# Patient Record
Sex: MALE | Race: WHITE | URBAN - NONMETROPOLITAN AREA
[De-identification: names, ages, dates, MRNs, and addresses within clinical notes are randomized per-mention and may not be internally consistent; named-entity substitution may affect disease eponyms.]

---

## 2017-10-13 ENCOUNTER — TELEPHONE (OUTPATIENT)
Dept: FAMILY MEDICINE CLINIC | Facility: CLINIC | Age: 42
End: 2017-10-13

## 2017-10-13 ENCOUNTER — OFFICE VISIT (OUTPATIENT)
Dept: FAMILY MEDICINE CLINIC | Facility: CLINIC | Age: 42
End: 2017-10-13

## 2017-10-13 VITALS
HEIGHT: 69 IN | DIASTOLIC BLOOD PRESSURE: 88 MMHG | BODY MASS INDEX: 43.9 KG/M2 | SYSTOLIC BLOOD PRESSURE: 132 MMHG | WEIGHT: 296.38 LBS | TEMPERATURE: 99 F | HEART RATE: 92 BPM

## 2017-10-13 DIAGNOSIS — Z00.00 ROUTINE HEALTH MAINTENANCE: Primary | ICD-10-CM

## 2017-10-13 DIAGNOSIS — E66.01 MORBID OBESITY DUE TO EXCESS CALORIES (HCC): ICD-10-CM

## 2017-10-13 DIAGNOSIS — R06.83 SNORING: ICD-10-CM

## 2017-10-13 DIAGNOSIS — M25.561 RIGHT KNEE PAIN, UNSPECIFIED CHRONICITY: ICD-10-CM

## 2017-10-13 DIAGNOSIS — K21.9 GASTROESOPHAGEAL REFLUX DISEASE WITHOUT ESOPHAGITIS: ICD-10-CM

## 2017-10-13 DIAGNOSIS — G47.20 DYSFUNCTION OF SLEEP STAGE OR AROUSAL: ICD-10-CM

## 2017-10-13 DIAGNOSIS — R53.83 FATIGUE, UNSPECIFIED TYPE: ICD-10-CM

## 2017-10-13 PROCEDURE — 99386 PREV VISIT NEW AGE 40-64: CPT | Performed by: FAMILY MEDICINE

## 2017-10-13 NOTE — PATIENT INSTRUCTIONS
monitor blood pressure. Sleep study    DIET,EXERSISE,WT LOSS.LOW CARB,HIGH PROTEIN DIET discussed,sign of GLYCEMIC INDEX. Aerobic exersise,interval training - 4-5x/wk. CORE STRENGTHENING - reviewed  goal wt.20 min spent w/ councilling  Vit D 2000 internati

## 2017-10-13 NOTE — PROGRESS NOTES
April Jackson is a 36year old male. Patient presents with:  Physical: room 8      HPI:   Physical  Presents with wife. Daytime fatigue, snoring, weight gain, obesity. Disordered sleep noted per wife with bizarre positions. Works as an .   Right tenderness medial.          ASSESSMENT AND PLAN:     Routine health maintenance  (primary encounter diagnosis)  Snoring  Dysfunction of sleep stage or arousal  Morbid obesity due to excess calories (hcc)  Gastroesophageal reflux disease without esophagitis

## 2017-10-13 NOTE — TELEPHONE ENCOUNTER
Wife at drug store and asking what B vitamin to get- advised- B Complex , and  [address] [facility],[place], [phone]. Routed o Dr Darin Peng to confirm.

## 2017-10-20 ENCOUNTER — NURSE ONLY (OUTPATIENT)
Dept: FAMILY MEDICINE CLINIC | Facility: CLINIC | Age: 42
End: 2017-10-20

## 2017-10-20 DIAGNOSIS — R53.83 FATIGUE, UNSPECIFIED TYPE: ICD-10-CM

## 2017-10-20 DIAGNOSIS — E66.01 MORBID OBESITY DUE TO EXCESS CALORIES (HCC): ICD-10-CM

## 2017-10-20 DIAGNOSIS — G47.20 DYSFUNCTION OF SLEEP STAGE OR AROUSAL: ICD-10-CM

## 2017-10-20 DIAGNOSIS — R06.83 SNORING: ICD-10-CM

## 2017-10-20 DIAGNOSIS — Z00.00 ROUTINE HEALTH MAINTENANCE: ICD-10-CM

## 2017-10-20 DIAGNOSIS — K21.9 GASTROESOPHAGEAL REFLUX DISEASE WITHOUT ESOPHAGITIS: ICD-10-CM

## 2017-10-20 PROCEDURE — 36415 COLL VENOUS BLD VENIPUNCTURE: CPT | Performed by: FAMILY MEDICINE

## 2017-10-20 PROCEDURE — 80061 LIPID PANEL: CPT | Performed by: FAMILY MEDICINE

## 2017-10-20 PROCEDURE — 82306 VITAMIN D 25 HYDROXY: CPT | Performed by: FAMILY MEDICINE

## 2017-10-20 PROCEDURE — 85025 COMPLETE CBC W/AUTO DIFF WBC: CPT | Performed by: FAMILY MEDICINE

## 2017-10-20 PROCEDURE — 80053 COMPREHEN METABOLIC PANEL: CPT | Performed by: FAMILY MEDICINE

## 2017-10-23 RX ORDER — ACETAMINOPHEN 160 MG
2000 TABLET,DISINTEGRATING ORAL DAILY
Qty: 100 CAPSULE | Refills: 0 | COMMUNITY
Start: 2017-10-23

## 2018-05-22 ENCOUNTER — MED REC SCAN ONLY (OUTPATIENT)
Dept: FAMILY MEDICINE CLINIC | Facility: CLINIC | Age: 43
End: 2018-05-22

## 2024-03-25 ENCOUNTER — WALK IN (OUTPATIENT)
Dept: URGENT CARE | Age: 49
End: 2024-03-25

## 2024-03-25 VITALS
HEART RATE: 64 BPM | DIASTOLIC BLOOD PRESSURE: 108 MMHG | RESPIRATION RATE: 18 BRPM | TEMPERATURE: 96.8 F | OXYGEN SATURATION: 99 % | SYSTOLIC BLOOD PRESSURE: 177 MMHG

## 2024-03-25 DIAGNOSIS — R03.0 ELEVATED BLOOD-PRESSURE READING WITHOUT DIAGNOSIS OF HYPERTENSION: ICD-10-CM

## 2024-03-25 DIAGNOSIS — H10.9 CONJUNCTIVITIS OF BOTH EYES, UNSPECIFIED CONJUNCTIVITIS TYPE: Primary | ICD-10-CM

## 2024-03-25 PROCEDURE — 99203 OFFICE O/P NEW LOW 30 MIN: CPT | Performed by: FAMILY MEDICINE

## 2024-03-25 RX ORDER — TOBRAMYCIN 3 MG/ML
SOLUTION/ DROPS OPHTHALMIC
Qty: 1 EACH | Refills: 0 | Status: SHIPPED | OUTPATIENT
Start: 2024-03-25

## 2024-04-03 ENCOUNTER — WALK IN (OUTPATIENT)
Dept: URGENT CARE | Age: 49
End: 2024-04-03

## 2024-04-03 VITALS
HEART RATE: 81 BPM | OXYGEN SATURATION: 96 % | TEMPERATURE: 98.1 F | SYSTOLIC BLOOD PRESSURE: 164 MMHG | DIASTOLIC BLOOD PRESSURE: 108 MMHG | RESPIRATION RATE: 18 BRPM

## 2024-04-03 DIAGNOSIS — H10.9 CONJUNCTIVITIS OF BOTH EYES, UNSPECIFIED CONJUNCTIVITIS TYPE: Primary | ICD-10-CM

## 2024-04-03 DIAGNOSIS — R03.0 ELEVATED BLOOD-PRESSURE READING WITHOUT DIAGNOSIS OF HYPERTENSION: ICD-10-CM

## 2024-04-03 PROCEDURE — 99214 OFFICE O/P EST MOD 30 MIN: CPT | Performed by: FAMILY MEDICINE

## 2024-04-03 RX ORDER — TOBRAMYCIN 3 MG/ML
SOLUTION/ DROPS OPHTHALMIC
Qty: 1 EACH | Refills: 1 | Status: SHIPPED | OUTPATIENT
Start: 2024-04-03

## 2024-07-09 ENCOUNTER — OFFICE VISIT (OUTPATIENT)
Dept: FAMILY MEDICINE CLINIC | Facility: CLINIC | Age: 49
End: 2024-07-09
Payer: COMMERCIAL

## 2024-07-09 VITALS
DIASTOLIC BLOOD PRESSURE: 88 MMHG | HEIGHT: 69.8 IN | OXYGEN SATURATION: 97 % | TEMPERATURE: 98 F | BODY MASS INDEX: 37.05 KG/M2 | SYSTOLIC BLOOD PRESSURE: 140 MMHG | RESPIRATION RATE: 18 BRPM | HEART RATE: 64 BPM | WEIGHT: 255.88 LBS

## 2024-07-09 DIAGNOSIS — Z79.1 ENCOUNTER FOR LONG-TERM (CURRENT) USE OF NSAIDS: ICD-10-CM

## 2024-07-09 DIAGNOSIS — Z23 NEED FOR VACCINATION: ICD-10-CM

## 2024-07-09 DIAGNOSIS — Z13.1 SCREENING FOR DIABETES MELLITUS: ICD-10-CM

## 2024-07-09 DIAGNOSIS — L24.81 IRRITANT CONTACT DERMATITIS DUE TO METALS: ICD-10-CM

## 2024-07-09 DIAGNOSIS — Z00.00 ENCOUNTER FOR MEDICAL EXAMINATION TO ESTABLISH CARE: ICD-10-CM

## 2024-07-09 DIAGNOSIS — R03.0 ELEVATED BLOOD PRESSURE READING: ICD-10-CM

## 2024-07-09 DIAGNOSIS — K21.9 GASTROESOPHAGEAL REFLUX DISEASE WITHOUT ESOPHAGITIS: ICD-10-CM

## 2024-07-09 DIAGNOSIS — G47.33 OSA ON CPAP: ICD-10-CM

## 2024-07-09 DIAGNOSIS — Z12.5 SCREENING FOR PROSTATE CANCER: ICD-10-CM

## 2024-07-09 DIAGNOSIS — E66.9 OBESITY (BMI 30-39.9): ICD-10-CM

## 2024-07-09 DIAGNOSIS — Z13.220 SCREENING, LIPID: ICD-10-CM

## 2024-07-09 DIAGNOSIS — Z12.11 SCREEN FOR COLON CANCER: ICD-10-CM

## 2024-07-09 DIAGNOSIS — Z00.00 PREVENTATIVE HEALTH CARE: Primary | ICD-10-CM

## 2024-07-09 PROBLEM — M25.561 RIGHT KNEE PAIN: Status: RESOLVED | Noted: 2017-10-13 | Resolved: 2024-07-09

## 2024-07-09 PROBLEM — R06.83 SNORING: Status: RESOLVED | Noted: 2017-10-13 | Resolved: 2024-07-09

## 2024-07-09 PROBLEM — R53.83 FATIGUE: Status: RESOLVED | Noted: 2017-10-13 | Resolved: 2024-07-09

## 2024-07-09 LAB
ALBUMIN SERPL-MCNC: 4 G/DL (ref 3.4–5)
ALBUMIN/GLOB SERPL: 1.1 {RATIO} (ref 1–2)
ALP LIVER SERPL-CCNC: 66 U/L
ALT SERPL-CCNC: 36 U/L
ANION GAP SERPL CALC-SCNC: 3 MMOL/L (ref 0–18)
AST SERPL-CCNC: 19 U/L (ref 15–37)
BILIRUB SERPL-MCNC: 0.8 MG/DL (ref 0.1–2)
BUN BLD-MCNC: 20 MG/DL (ref 9–23)
CALCIUM BLD-MCNC: 9.7 MG/DL (ref 8.5–10.1)
CHLORIDE SERPL-SCNC: 106 MMOL/L (ref 98–112)
CHOLEST SERPL-MCNC: 119 MG/DL (ref ?–200)
CO2 SERPL-SCNC: 29 MMOL/L (ref 21–32)
COMPLEXED PSA SERPL-MCNC: 0.9 NG/ML (ref ?–4)
CREAT BLD-MCNC: 0.78 MG/DL
EGFRCR SERPLBLD CKD-EPI 2021: 109 ML/MIN/1.73M2 (ref 60–?)
FASTING PATIENT LIPID ANSWER: NO
FASTING STATUS PATIENT QL REPORTED: NO
GLOBULIN PLAS-MCNC: 3.8 G/DL (ref 2.8–4.4)
GLUCOSE BLD-MCNC: 95 MG/DL (ref 70–99)
HDLC SERPL-MCNC: 50 MG/DL (ref 40–59)
LDLC SERPL CALC-MCNC: 55 MG/DL (ref ?–100)
NONHDLC SERPL-MCNC: 69 MG/DL (ref ?–130)
OSMOLALITY SERPL CALC.SUM OF ELEC: 288 MOSM/KG (ref 275–295)
POTASSIUM SERPL-SCNC: 4.3 MMOL/L (ref 3.5–5.1)
PROT SERPL-MCNC: 7.8 G/DL (ref 6.4–8.2)
SODIUM SERPL-SCNC: 138 MMOL/L (ref 136–145)
TRIGL SERPL-MCNC: 63 MG/DL (ref 30–149)
VLDLC SERPL CALC-MCNC: 9 MG/DL (ref 0–30)

## 2024-07-09 PROCEDURE — 99386 PREV VISIT NEW AGE 40-64: CPT | Performed by: FAMILY MEDICINE

## 2024-07-09 PROCEDURE — 90715 TDAP VACCINE 7 YRS/> IM: CPT | Performed by: FAMILY MEDICINE

## 2024-07-09 PROCEDURE — 84153 ASSAY OF PSA TOTAL: CPT | Performed by: FAMILY MEDICINE

## 2024-07-09 PROCEDURE — 80053 COMPREHEN METABOLIC PANEL: CPT | Performed by: FAMILY MEDICINE

## 2024-07-09 PROCEDURE — 3079F DIAST BP 80-89 MM HG: CPT | Performed by: FAMILY MEDICINE

## 2024-07-09 PROCEDURE — 90471 IMMUNIZATION ADMIN: CPT | Performed by: FAMILY MEDICINE

## 2024-07-09 PROCEDURE — 3008F BODY MASS INDEX DOCD: CPT | Performed by: FAMILY MEDICINE

## 2024-07-09 PROCEDURE — 3077F SYST BP >= 140 MM HG: CPT | Performed by: FAMILY MEDICINE

## 2024-07-09 PROCEDURE — 83036 HEMOGLOBIN GLYCOSYLATED A1C: CPT | Performed by: FAMILY MEDICINE

## 2024-07-09 PROCEDURE — 80061 LIPID PANEL: CPT | Performed by: FAMILY MEDICINE

## 2024-07-09 RX ORDER — CLOBETASOL PROPIONATE 0.5 MG/G
1 OINTMENT TOPICAL 2 TIMES DAILY
Qty: 15 G | Refills: 0 | Status: SHIPPED | OUTPATIENT
Start: 2024-07-09

## 2024-07-09 RX ORDER — COVID-19 ANTIGEN TEST
220 KIT MISCELLANEOUS
COMMUNITY

## 2024-07-09 RX ORDER — CETIRIZINE HYDROCHLORIDE 5 MG/1
5 TABLET ORAL DAILY
COMMUNITY

## 2024-07-09 RX ORDER — OMEPRAZOLE 20 MG/1
20 CAPSULE, DELAYED RELEASE ORAL
COMMUNITY

## 2024-07-09 NOTE — PATIENT INSTRUCTIONS
1. Preventative health care  I reviewed age-appropriate preventive health screening exams as well as immunizations.  - Lipid Panel [E]; Future  - Comp Metabolic Panel (14) [E]; Future  - PSA, Total (Screening) [E]; Future  - Hemoglobin A1C [E]; Future  - Lipid Panel [E]  - Comp Metabolic Panel (14) [E]  - PSA, Total (Screening) [E]  - Hemoglobin A1C [E]    2. Encounter for medical examination to establish care  See #1    3. Screen for colon cancer  Reviewed current recommendation for colon cancer screening.  Patient prefers Cologuard-ordered  - COLOGUARD COLON CANCER SCREENING (EXTERNAL)    4. Screening for prostate cancer  Begin annual surveillance  - PSA, Total (Screening) [E]; Future  - PSA, Total (Screening) [E]    5. Screening, lipid  Reviewed goals for lipids  - Lipid Panel [E]; Future  - Lipid Panel [E]    6. Screening for diabetes mellitus  Discussed goals for A1c and diagnosis of diabetes  - Hemoglobin A1C [E]; Future  - Hemoglobin A1C [E]    7. Obesity (BMI 30-39.9)  Discussed importance of lower carbohydrate food choices, eating behavior modification, avoidance of starches and increase daily aerobic activity with a target weight loss of 1 to 2 pounds per week    8. Need for vaccination  Reviewed age-appropriate medications.  Tdap booster given  - TdaP (Boostrix/Adacel) Vaccine (> 7 Y)    9. Irritant contact dermatitis due to metals  Discussed likely contributing factor.  Would recommend putting a piece of tape over his pants snap or applying nail polish over.  Patient given clobetasol 0.05% cream applied to the affected area twice daily till clear then as needed    10. WILLIS on CPAP  Discussed importance of nightly use of CPAP of at least 4 to 5 hours to obtain cardiovascular benefits.  Discussed cardiovascular complications of untreated or inadequately treated sleep apnea    11. Elevated blood pressure reading  I reviewed goals for blood pressure as well as conservative management of hypertension including  sodium restriction, daily aerobic activity, alcohol moderation, smoking cessation, and maintaining ideal body weight.  I have asked patient to begin monitoring his blood pressure at home taking every 3 consecutive readings once daily at various times of the day reporting his numbers over the next several weeks  Would recommend follow-up for EKG    12. Encounter for long-term (current) use of NSAIDs  Patient advised to only use Aleve and other NSAIDs when pain level warrants it.  Discussed potential effects on blood pressure and kidney function with frequent NSAIDs, may more safely use acetaminophen    13. GERD w/o esophagitis  Anti-reflux measures reviewed.  Avoid large meals. Allow at least 3 hrs between eating and laying down to facilitate gastric emptying.  Limit caffeine to 2 servings per day.  Avoid spicy or acidic foods and liquids.  Moderation of alcohol.  Avoid nsaids and aspirin.  May use Tylenol prn pain  May continue over-the-counter Prilosec

## 2024-07-09 NOTE — H&P
Konstantin Giordano is a 49 year old male who presents for a complete physical exam.   HPI:   Pt voices concerns: bp was elevated at  visit.  Here w/ wife  Wt Readings from Last 6 Encounters:   07/09/24 255 lb 13.9 oz (116.1 kg)   10/13/17 296 lb 6 oz (134.4 kg)     Body mass index is 36.92 kg/m².     Cholesterol, Total (mg/dL)   Date Value   10/20/2017 122     HDL Cholesterol (mg/dL)   Date Value   10/20/2017 42 (L)     LDL Cholesterol (mg/dL)   Date Value   10/20/2017 69     AST (U/L)   Date Value   10/20/2017 24     ALT (U/L)   Date Value   10/20/2017 60      Current Outpatient Medications   Medication Sig Dispense Refill    cetirizine 5 MG Oral Tab Take 1 tablet (5 mg total) by mouth daily.      Naproxen Sodium (ALEVE) 220 MG Oral Cap Take 220 mg by mouth.      omeprazole 20 MG Oral Capsule Delayed Release Take 1 capsule (20 mg total) by mouth every morning before breakfast.      clobetasol 0.05 % External Ointment Apply 1 Application topically 2 (two) times daily. 15 g 0    Vitamin D3 2000 units Oral Cap Take 1 capsule (2,000 Units total) by mouth daily. 100 capsule 0     Not on File     No past medical history on file.   Past Surgical History:   Procedure Laterality Date    Other surgical history Right 1994    arthroscopy    Other surgical history Left     closed reduction left tib-fib, ankle      Family History   Problem Relation Age of Onset    Heart Disorder Father         CABG    Hypertension Father     Pulmonary Disease Mother     Heart Disorder Mother         AV rupture    Hypertension Mother     No Known Problems Daughter     No Known Problems Daughter     No Known Problems Son     No Known Problems Sister       Social History:  Social History     Socioeconomic History    Marital status:    Tobacco Use    Smoking status: Never     Passive exposure: Never    Smokeless tobacco: Never   Substance and Sexual Activity    Alcohol use: Yes     Alcohol/week: 24.0 standard drinks of alcohol     Types: 24 Cans of  beer per week     Comment: occas   Other Topics Concern    Caffeine Concern No    Weight Concern Yes    Sleep center in [place]  Occ: . : +. Children: 3.   Exercise: minimal.  Diet: watches minimally     REVIEW OF SYSTEMS:   GENERAL: generally feels well, no fatigue, denies excessive daytime drowsiness, good appetite  SKIN: denies any unusual skin lesions or rashes  EYES:denies blurred vision or double vision, glasses/ contacts: +cheaters, last eye exam: < 1 yr  ENT: denies nasal congestion, PND or ST,denies snoring and reported periods of apnea- uses CPAP nightly, ~5-6hrs per night, denies hearing deficits  LUNGS: denies shortness of breath with exertion, no coughing or wheezing  CARDIOVASCULAR: denies chest pain on exertion, palpations, anginal equivalent symptoms, no claudication , no peripheral edema  GI: denies abdominal pain,denies constipation, diarrhea, or change in bowel habits, prilosec OTC daily for heartburn  : denies dysuria, hesitancy,nocturia, decreased urine stream, incontinence, erectile dysfunction, decreased libido   MUSCULOSKELETAL: denies back pain, daily 1 aleve for knee pain  NEURO: denies headaches, tremors, dizziness, numbness and weakness  PSYCHE: denies depression or anxiety, denies any sleep difficulty  HEMATOLOGIC: denies unexplained bruising or bleeding  ENDOCRINE: denies heat or cold intolerance , no unexplained wt loss or gain  EXAM:   /88 (BP Location: Left arm, Patient Position: Sitting, Cuff Size: adult)   Pulse 64   Temp 97.6 °F (36.4 °C) (Temporal)   Resp 18   Ht 5' 9.8\" (1.773 m)   Wt 255 lb 13.9 oz (116.1 kg)   SpO2 97%   BMI 36.92 kg/m²   Body mass index is 36.92 kg/m².   GENERAL: well developed, well nourished,in no apparent distress  SKIN: no rashes,no suspicious lesions  ENT: EAC:  Clear, TMs: intact, Nose: turbinates normal, septum: midline, discharge: none, Pharynx: class 3 airway, no oral lesions  EYES:PERRLA, EOMI, normal optic  disk,conjunctiva are clear  NECK: supple,no adenopathy,no bruits, no thyromegaly  LUNGS: clear to auscultation, no w/r/r  CARDIO: RRR without murmur, no S3, S4, strong peripheral pulses, no peripheral edema  GI: +NBS's,no masses, HSM or tenderness  : two descended testes,no masses,no hernia,no penile lesions  BACK:  : FROM, No lateral curves, Flexion:  Fingers to floor   EXTREMITIES: no cyanosis, clubbing or edema, FROM of all joints tested  NEURO: A &O X 3,cranial nerves are intact,motor and sensory are grossly intact, DTRs +2/4 UE/LE bilaterally  PSYCH: affect: bright, speech: clear and coherent, Insight: appropriate  ASSESSMENT AND PLAN:   Konstantin Giordano is a 49 year old male   Encounter Diagnoses   Name Primary?    Preventative health care Yes    Encounter for medical examination to establish care     Screen for colon cancer     Screening for prostate cancer     Screening, lipid     Screening for diabetes mellitus     Obesity (BMI 30-39.9)     Need for vaccination     Irritant contact dermatitis due to metals     WILLIS on CPAP     Elevated blood pressure reading     Encounter for long-term (current) use of NSAIDs     Gastroesophageal reflux disease without esophagitis      Orders Placed This Encounter   Procedures    Lipid Panel [E]    Comp Metabolic Panel (14) [E]    PSA, Total (Screening) [E]    Hemoglobin A1C [E]    TdaP (Boostrix/Adacel) Vaccine (> 7 Y)     Meds & Refills for this Visit:  Requested Prescriptions     Signed Prescriptions Disp Refills    clobetasol 0.05 % External Ointment 15 g 0     Sig: Apply 1 Application topically 2 (two) times daily.     Imaging & Consults:  TETANUS, DIPHTHERIA TOXOIDS AND ACELLULAR PERTUSIS VACCINE (TDAP), >7 YEARS, IM USE  COLOGUARD COLON CANCER SCREENING (EXTERNAL)  No follow-ups on file.  Patient Instructions   1. Preventative health care  I reviewed age-appropriate preventive health screening exams as well as immunizations.  - Lipid Panel [E]; Future  - Comp Metabolic  Panel (14) [E]; Future  - PSA, Total (Screening) [E]; Future  - Hemoglobin A1C [E]; Future  - Lipid Panel [E]  - Comp Metabolic Panel (14) [E]  - PSA, Total (Screening) [E]  - Hemoglobin A1C [E]    2. Encounter for medical examination to establish care  See #1    3. Screen for colon cancer  Reviewed current recommendation for colon cancer screening.  Patient prefers Cologuard-ordered  - COLOGUARD COLON CANCER SCREENING (EXTERNAL)    4. Screening for prostate cancer  Begin annual surveillance  - PSA, Total (Screening) [E]; Future  - PSA, Total (Screening) [E]    5. Screening, lipid  Reviewed goals for lipids  - Lipid Panel [E]; Future  - Lipid Panel [E]    6. Screening for diabetes mellitus  Discussed goals for A1c and diagnosis of diabetes  - Hemoglobin A1C [E]; Future  - Hemoglobin A1C [E]    7. Obesity (BMI 30-39.9)  Discussed importance of lower carbohydrate food choices, eating behavior modification, avoidance of starches and increase daily aerobic activity with a target weight loss of 1 to 2 pounds per week    8. Need for vaccination  Reviewed age-appropriate medications.  Tdap booster given  - TdaP (Boostrix/Adacel) Vaccine (> 7 Y)    9. Irritant contact dermatitis due to metals  Discussed likely contributing factor.  Would recommend putting a piece of tape over his pants snap or applying nail polish over.  Patient given clobetasol 0.05% cream applied to the affected area twice daily till clear then as needed    10. WILLIS on CPAP  Discussed importance of nightly use of CPAP of at least 4 to 5 hours to obtain cardiovascular benefits.  Discussed cardiovascular complications of untreated or inadequately treated sleep apnea    11. Elevated blood pressure reading  I reviewed goals for blood pressure as well as conservative management of hypertension including sodium restriction, daily aerobic activity, alcohol moderation, smoking cessation, and maintaining ideal body weight.  I have asked patient to begin monitoring  his blood pressure at home taking every 3 consecutive readings once daily at various times of the day reporting his numbers over the next several weeks  Would recommend follow-up for EKG    12. Encounter for long-term (current) use of NSAIDs  Patient advised to only use Aleve and other NSAIDs when pain level warrants it.  Discussed potential effects on blood pressure and kidney function with frequent NSAIDs, may more safely use acetaminophen    13. GERD w/o esophagitis  Anti-reflux measures reviewed.  Avoid large meals. Allow at least 3 hrs between eating and laying down to facilitate gastric emptying.  Limit caffeine to 2 servings per day.  Avoid spicy or acidic foods and liquids.  Moderation of alcohol.  Avoid nsaids and aspirin.  May use Tylenol prn pain  May continue over-the-counter Prilosec    Brijesh Gonzalez DO, FAAFP

## 2024-07-10 LAB
EST. AVERAGE GLUCOSE BLD GHB EST-MCNC: 108 MG/DL (ref 68–126)
HBA1C MFR BLD: 5.4 % (ref ?–5.7)

## 2024-07-20 ENCOUNTER — MED REC SCAN ONLY (OUTPATIENT)
Dept: FAMILY MEDICINE CLINIC | Facility: CLINIC | Age: 49
End: 2024-07-20